# Patient Record
Sex: FEMALE | Race: WHITE
[De-identification: names, ages, dates, MRNs, and addresses within clinical notes are randomized per-mention and may not be internally consistent; named-entity substitution may affect disease eponyms.]

---

## 2017-01-16 ENCOUNTER — HOSPITAL ENCOUNTER (EMERGENCY)
Dept: HOSPITAL 80 - FED | Age: 52
Discharge: HOME | End: 2017-01-16
Payer: COMMERCIAL

## 2017-01-16 VITALS
RESPIRATION RATE: 18 BRPM | DIASTOLIC BLOOD PRESSURE: 82 MMHG | SYSTOLIC BLOOD PRESSURE: 105 MMHG | OXYGEN SATURATION: 98 %

## 2017-01-16 VITALS — TEMPERATURE: 98.6 F

## 2017-01-16 VITALS — HEART RATE: 86 BPM

## 2017-01-16 DIAGNOSIS — R19.7: Primary | ICD-10-CM

## 2017-01-16 DIAGNOSIS — R11.10: ICD-10-CM

## 2017-01-16 DIAGNOSIS — J45.909: ICD-10-CM

## 2017-01-16 LAB
% IMMATURE GRANULYOCYTES: 0.5 % (ref 0–1.1)
ABSOLUTE IMMATURE GRANULOCYTES: 0.04 10^3/UL (ref 0–0.1)
ABSOLUTE NRBC COUNT: 0.03 10^3/UL (ref 0–0.01)
ADD DIFF?: NO
ADD MORPH?: NO
ADD SCAN?: NO
ALBUMIN SERPL-MCNC: 4.2 G/DL (ref 3.5–5)
ALP SERPL-CCNC: 71 IU/L (ref 38–126)
ALT SERPL-CCNC: 37 IU/L (ref 9–52)
ANION GAP SERPL CALC-SCNC: 13 MEQ/L (ref 8–16)
AST SERPL-CCNC: 27 IU/L (ref 14–46)
ATYPICAL LYMPHOCYTE FLAG: 0 (ref 0–99)
BILIRUB SERPL-MCNC: 0.6 MG/DL (ref 0.1–1.4)
BILIRUBIN-CONJUGATED: 0.3 MG/DL (ref 0–0.5)
BILIRUBIN-UNCONJUGATED: 0.3 MG/DL (ref 0–1.1)
CALCIUM SERPL-MCNC: 9.9 MG/DL (ref 8.5–10.4)
CHLORIDE SERPL-SCNC: 105 MEQ/L (ref 97–110)
CO2 SERPL-SCNC: 20 MEQ/L (ref 22–31)
COLOR UR: YELLOW
CREAT SERPL-MCNC: 0.7 MG/DL (ref 0.6–1)
ERYTHROCYTE [DISTWIDTH] IN BLOOD BY AUTOMATED COUNT: 13.8 % (ref 11.5–15.2)
FRAGMENT RBC FLAG: 0 (ref 0–99)
GFR SERPL CREATININE-BSD FRML MDRD: > 60 ML/MIN/{1.73_M2}
GLUCOSE SERPL-MCNC: 106 MG/DL (ref 70–100)
HCT VFR BLD CALC: 40.8 % (ref 38–47)
HGB BLD-MCNC: 13.6 G/DL (ref 12.6–16.3)
LEFT SHIFT FLG: 0 (ref 0–99)
LIPEMIA HEMOLYSIS FLAG: 80 (ref 0–99)
MCH RBC BLDCO QN: 30.8 PG (ref 27.9–34.1)
MCHC RBC AUTO-ENTMCNC: 33.3 G/DL (ref 32.4–36.7)
MCV RBC AUTO: 92.3 FL (ref 81.5–99.8)
NITRITE UR QL STRIP: NEGATIVE
NRBC-AUTO%: 0.3 % (ref 0–0.2)
PH UR STRIP: 6 [PH] (ref 5–7.5)
PLATELET # BLD: 268 10^3/UL (ref 150–400)
PLATELET CLUMPS FLAG: 20 (ref 0–99)
PMV BLD AUTO: 9.6 FL (ref 8.7–11.7)
POTASSIUM SERPL-SCNC: 4.2 MEQ/L (ref 3.5–5.2)
PROT SERPL-MCNC: 6.7 G/DL (ref 6.3–8.2)
RBC # BLD AUTO: 4.42 10^6/UL (ref 4.18–5.33)
SODIUM SERPL-SCNC: 138 MEQ/L (ref 134–144)
SP GR UR STRIP: 1.02 (ref 1–1.03)

## 2017-01-17 ENCOUNTER — HOSPITAL ENCOUNTER (INPATIENT)
Dept: HOSPITAL 80 - FED | Age: 52
LOS: 3 days | Discharge: HOME | DRG: 391 | End: 2017-01-20
Attending: INTERNAL MEDICINE | Admitting: INTERNAL MEDICINE
Payer: COMMERCIAL

## 2017-01-17 DIAGNOSIS — Z86.718: ICD-10-CM

## 2017-01-17 DIAGNOSIS — G35: ICD-10-CM

## 2017-01-17 DIAGNOSIS — J45.909: ICD-10-CM

## 2017-01-17 DIAGNOSIS — G93.40: ICD-10-CM

## 2017-01-17 DIAGNOSIS — A08.4: Primary | ICD-10-CM

## 2017-01-17 DIAGNOSIS — M25.559: ICD-10-CM

## 2017-01-17 DIAGNOSIS — E03.9: ICD-10-CM

## 2017-01-17 LAB
% IMMATURE GRANULYOCYTES: (no result) % (ref 0–1.1)
% IMMATURE GRANULYOCYTES: 0.3 % (ref 0–1.1)
ABSOLUTE IMMATURE GRANULOCYTES: (no result) 10^3/UL (ref 0–0.1)
ABSOLUTE IMMATURE GRANULOCYTES: 0.02 10^3/UL (ref 0–0.1)
ABSOLUTE NRBC COUNT: (no result) 10^3/UL (ref 0–0.01)
ABSOLUTE NRBC COUNT: 0.02 10^3/UL (ref 0–0.01)
ADD DIFF?: (no result)
ADD DIFF?: NO
ADD MORPH?: (no result)
ADD MORPH?: NO
ADD SCAN?: (no result)
ADD SCAN?: NO
ALBUMIN SERPL-MCNC: 3.7 G/DL (ref 3.5–5)
ALP SERPL-CCNC: 59 IU/L (ref 38–126)
ALT SERPL-CCNC: 34 IU/L (ref 9–52)
ANION GAP SERPL CALC-SCNC: 14 MEQ/L (ref 8–16)
AST SERPL-CCNC: 33 IU/L (ref 14–46)
ATYPICAL LYMPHOCYTE FLAG: (no result) (ref 0–99)
ATYPICAL LYMPHOCYTE FLAG: 10 (ref 0–99)
BILIRUB SERPL-MCNC: 0.7 MG/DL (ref 0.1–1.4)
BILIRUBIN-CONJUGATED: 0.1 MG/DL (ref 0–0.5)
BILIRUBIN-UNCONJUGATED: 0.6 MG/DL (ref 0–1.1)
CALCIUM SERPL-MCNC: 9.1 MG/DL (ref 8.5–10.4)
CHLORIDE SERPL-SCNC: 105 MEQ/L (ref 97–110)
CO2 SERPL-SCNC: 19 MEQ/L (ref 22–31)
COLOR UR: YELLOW
CREAT SERPL-MCNC: 0.5 MG/DL (ref 0.6–1)
ERYTHROCYTE [DISTWIDTH] IN BLOOD BY AUTOMATED COUNT: (no result) % (ref 11.5–15.2)
ERYTHROCYTE [DISTWIDTH] IN BLOOD BY AUTOMATED COUNT: 13.6 % (ref 11.5–15.2)
FRAGMENT RBC FLAG: (no result) (ref 0–99)
FRAGMENT RBC FLAG: 0 (ref 0–99)
GFR SERPL CREATININE-BSD FRML MDRD: > 60 ML/MIN/{1.73_M2}
GLUCOSE SERPL-MCNC: 102 MG/DL (ref 70–100)
HCT VFR BLD CALC: (no result) % (ref 38–47)
HCT VFR BLD CALC: 34.7 % (ref 38–47)
HGB BLD-MCNC: (no result) G/DL (ref 12.6–16.3)
HGB BLD-MCNC: 11.8 G/DL (ref 12.6–16.3)
LEFT SHIFT FLG: (no result) (ref 0–99)
LEFT SHIFT FLG: 0 (ref 0–99)
LIPEMIA HEMOLYSIS FLAG: (no result) (ref 0–99)
LIPEMIA HEMOLYSIS FLAG: 90 (ref 0–99)
LITHIUM SERPL-SCNC: < 0.2 MEQ/L (ref 0.6–1.2)
MCH RBC BLDCO QN: (no result) PG (ref 27.9–34.1)
MCH RBC BLDCO QN: 31.3 PG (ref 27.9–34.1)
MCHC RBC AUTO-ENTMCNC: (no result) G/DL (ref 32.4–36.7)
MCHC RBC AUTO-ENTMCNC: 34 G/DL (ref 32.4–36.7)
MCV RBC AUTO: (no result) FL (ref 81.5–99.8)
MCV RBC AUTO: 92 FL (ref 81.5–99.8)
NITRITE UR QL STRIP: NEGATIVE
NRBC-AUTO%: (no result) % (ref 0–0.2)
NRBC-AUTO%: 0.3 % (ref 0–0.2)
PH UR STRIP: 6 [PH] (ref 5–7.5)
PLATELET # BLD: (no result) 10^3/UL (ref 150–400)
PLATELET # BLD: 196 10^3/UL (ref 150–400)
PLATELET CLUMPS FLAG: (no result) (ref 0–99)
PLATELET CLUMPS FLAG: 30 (ref 0–99)
PMV BLD AUTO: (no result) FL (ref 8.7–11.7)
PMV BLD AUTO: 10 FL (ref 8.7–11.7)
POTASSIUM SERPL-SCNC: 4 MEQ/L (ref 3.5–5.2)
PROT SERPL-MCNC: 6.5 G/DL (ref 6.3–8.2)
RBC # BLD AUTO: (no result) 10^6/UL (ref 4.18–5.33)
RBC # BLD AUTO: 3.77 10^6/UL (ref 4.18–5.33)
SODIUM SERPL-SCNC: 138 MEQ/L (ref 134–144)
SP GR UR STRIP: 1.01 (ref 1–1.03)

## 2017-01-17 PROCEDURE — A9585 GADOBUTROL INJECTION: HCPCS

## 2017-01-17 RX ADMIN — SODIUM CHLORIDE SCH MLS: 900 INJECTION, SOLUTION INTRAVENOUS at 22:04

## 2017-01-17 RX ADMIN — ALBUTEROL SULFATE SCH: 90 AEROSOL, METERED RESPIRATORY (INHALATION) at 22:06

## 2017-01-17 RX ADMIN — LEVALBUTEROL TARTRATE SCH: 45 AEROSOL, METERED ORAL at 22:07

## 2017-01-17 NOTE — MR
MRI of the Brain (Without and With Contrast) at 1514 hours



Clinical Indications:  Multiple sclerosis, G35, vertigo.



COMPARISON:  MRI brain August 2015.



Technique:  T1-weighted images were acquired axially and sagittally from the foramen magnum to the ve
rtex.  Axial fast inversion-recovery, fast T2-weighted, and diffusion-weighted axial images were obta
ined, without contrast.  Postcontrast axial and coronal images, with the uneventful intravenous admin
istration of 5 mL mL Gadavist contrast.



Findings:  The ventricles, cisterns, and sulci are widened consistent with atrophy.  No hydrocephalus
, midline shift, herniation, or epidural/subdural hematomas.  No intracranial hemorrhage or masses.  
Diffusion-weighted images demonstrate no acute infarct.  Cerebellar tonsils are in normal position.  
Pituitary gland is normal in size.  Normal signal flow void in the superior sagittal sinus, basilar a
rtery, and bilateral internal carotid arteries indicating patency.  Multiple nonenhancing demyelinati
ng plaques again noted throughout bilateral cerebral hemispheres, especially bilateral frontal, parie
bill and occipital lobes, in the periventricular white matter.  No enhancing plaques.  No significant 
change in nonenhancing demyelinating plaques when compared to the August 2015 study.  No evidence of 
brainstem or cerebellar infarcts or enhancing masses.  Postcontrast images demonstrate no enhancing l
esions or abnormal leptomeningeal enhancement.  Paranasal sinuses and mastoid air cells are clear. 



Impressions

1.  No significant change in multiple nonenhancing demyelinating plaques throughout bilateral cerebra
l hemispheres. 

2.  No acute hemorrhage, definite acute infarct, hydrocephalus or mass effect.

3.  No enhancing lesions.



Findings and recommendations discussed with Dr. Tavo Davis at 1630 hours, on January 17, 2017.



E:amm

## 2017-01-17 NOTE — GHP
[f 
rep st]



                                                            HISTORY AND PHYSICAL





DATE OF ADMISSION:  01/17/2017



CHIEF COMPLAINT:  Acute encephalopathy.



HISTORY OF PRESENT ILLNESS:  The patient is a 51-year-old female, with history 
of multiple sclerosis followed by Dr. Tavo Davis, asthma, and chronic pain, 
who was sent over from Dr. Davis' office with acute encephalopathy.  Most 
of her history is per ED report and patient's .  They had recently been 
visiting her family in [place] last week.  They returned on Wednesday, 
and patient began having nausea.  This then progressed to diarrhea and vomiting 
on Saturday, as well as confusion and lethargy.  The patient presented to the 
emergency room here yesterday with normal vitals, and patient wished to go 
home.  They returned today for persistent confusion while in clinic.  She 
denies any ill contacts.  No cough.  Intermittent headaches.  Has not been 
eating or drinking much fluids over the last several days.  No myalgias.  Has 
had some hallucinations, per .  She recently underwent a thumb surgery 
December 28th.  At that time, she started on Nucynta.  She also was started on 
a Butrans patch recently, I am unclear of the date, per her pain doctor for 
chronic hip pain.  Denies fevers at home.  No chills or sweats.  Of note, 
patient has been taking Tysabri injections.  Missed her last dose.  Next 
scheduled January 18th.



REVIEW OF SYSTEMS:  I completed a 10-point review of systems, negative except 
as noted in HPI.



PAST MEDICAL HISTORY:  

1.  Asthma.

2.  MS.

3.  Interstitial cystitis.

4.  Chronic hip pain.

5.  Migraines.

6.  Hypothyroidism.

7.  Axillary DVT secondary port.

8.  Left ventricular papillary muscle tumor.



PAST SURGICAL HISTORY:  

1.  Left thumb surgery.

2.  Bilateral knees.

3.  Hernia repair.

4.  Tonsillectomy.

5.  Breast biopsy.



SOCIAL HISTORY:  Lives in [place] with her , phone number is [phone]
[phone].  Drinks alcohol socially.  No drugs or tobacco.



FAMILY HISTORY:  Mother with a blood disorder.



PHYSICAL EXAM:  VITAL SIGNS:  Temperature 36.6, blood pressure 105/72, heart 
rate 77, respirations 16, 96% on room air.  GENERAL:  Patient is tired appearing
, no acute distress.  HEENT:  PERRLA.  EOMI.  Oropharynx is clear, without 
exudate or erythema.  CV:  Regular rate and rhythm.  No murmurs, gallops, or 
rubs.  LUNGS:  Clear to auscultation.  ABDOMEN:  Mildly tender diffusely.  No 
rebound or guarding.  Positive bowel sounds throughout.  :  No suprapubic 
tenderness.  MUSCULOSKELETAL:  Patient not participating fully in exam.  SKIN:  
No rash or ulceration.  NEUROLOGIC:  2 through 12 intact.  However, patient 
again is not participating in my exam.  PSYCHIATRIC:  Alert to place only.



LABS:  WBC 6.7, hemoglobin 11, hematocrit 34, platelets 186.  Sodium 138, 
potassium 4, chloride 105, carbon dioxide 19, BUN 10, creatinine 0.5, glucose 
102.  UA is negative. 



Abdominal CT pending. 



MRI brain pending.



MEDICATIONS:  

1.  Tramadol 50 mg p.r.n.

2.  Nucynta 50 mg p.o. q.4-6 hours p.r.n.

3.  Sumatriptan p.r.n.

4.  Zofran p.r.n.

5.  Tysabri monthly.

6.  Dulera inhaler.

7.  Namenda 10 mg twice daily.

8.  Melatonin.

9.  Lithium 300 mg at bedtime.

10.  Levothyroxine 75 mcg.

11.  Xopenex inhaler three times daily.

12.  Gabapentin 300 mg at bedtime.

13.  Dextromethorphan q.12 hours.

14.  Butrans 10 mcg patch.

15.  Albuterol inhaled as needed.



ALLERGIES:  Amoxicillin, potassium carbonate, and Glatiramer acetate.



ASSESSMENT AND PLAN:  

1.  Acute encephalopathy:  Differential is broad at this point.  Considering 
infection versus metabolic versus medication versus neurologic.  There was 
concern for leukoencephalopathy, given patient is on immunosuppressants.  UA is 
negative, as well as abdominal CT.  MRI is pending of brain.  No metabolic 
abnormalities.  The patient has recently started on Nucynta, as well as Butrans 
patch, which could contribute to encephalopathy.  Per , she has been 
having hallucinations.  Also checking a lithium level.  Checking TSH. Dr. Curtis will evaluate inpatient.

2.  Multiple sclerosis:  Currently on Tysabri injections.  MRI is pending.  
Neurology has been consulted.

3.  Asthma:  Continue home inhalers.

4.  Chronic hip pain:  We will hold patch for now.  We will dose p.r.n. 
tramadol if needed.

5.  Migraines.  Imitrex p.r.n.

6.  Hypothyroidism.  Continue levothyroxine.  

7.  History of ventricular papillary muscle tumor:  Followed by Dr. Watt

8.  History of axillary deep vein thrombosis:  This is associated to a port.  
This had been removed.

9.  Diarrhea, vomiting: suspect viral gasteroenteritis. Denies any ill 
contacts.  Electrolytes are within normal.  We will check a C. difficile.

10.  Diet:  Regular.

11.  Deep vein thrombosis prophylaxis.  Lovenox.  



DISPOSITION:  Patient warrants inpatient admission, given acute encephalopathy, 
pending further studies, MRI, and Neurology consult.





Job #:  [id number]

MTDD

## 2017-01-17 NOTE — GCON
[f rep st]



                                                                    CONSULTATION





NEUROLOGIC CONSULTATION.



HISTORY:  The patient is a 51-year-old woman who I follow in my clinic regularly for multiple scleros
is.  She is currently on Tysabri and is JOHN virus antibody positive.  She has been doing relatively we
ll overall with her multiple sclerosis, but in the last several days, has had a precipitating decline
 in function in the setting of a gastroenteritis.  She has not been able to take some of her medicine
s and has not had her Tysabri infusion this month.  Her symptoms were bad enough to come to the emerg
ency room last night, but she did not want to be admitted.  I spoke to her  today and was conc
erned about her altered mental status, so she was advised to come to the emergency room for admission
 and further workup to be sure we are not missing any acute lesions from multiple sclerosis with the 
possibility of PML.  She is on several pain medications which might be contributing, but we are not s
ure the exact timing of those changes.  She currently has a hard time answering the historical questi
ons, but is denying having prominent nausea or headache or focal numbness or weakness.  She has a bit
 of a tendency to be unresponsive, but she will smile occasionally and can answer questions sometimes
 appropriately and sometimes following commands, but she may also perseverate at times or have a hard
 time fully comprehending what I am asking her.  She cannot give history to describe any alleviating 
or exacerbating factors.  Symptoms by her description sound as if they are fairly mild, but the excep
tion is this language problem.  She seemed to acknowledge that she knows what she wants to say, but h
as a hard time expressing herself.  Her  is able to clarify that he feels she was certainly wo
rse several hours ago compared to 24 hours ago when she was already having a hard time communicating.
  However, a few hours after I initially saw her today and came back to the emergency department afte
r reviewing the MRI and told her it was normal, that is when she 1st started to smile and was opening
 her eyes spontaneously, where as earlier, she was keeping her eyes closed and seemed much more uncom
fortable.



REVIEW OF SYSTEMS:  Otherwise, a 10-point review of systems was completed and unremarkable except for
 that noted above.



PAST MEDICAL HISTORY:  Notable for the multiple sclerosis.  She has a left ventricular papillary tumo
r, history of axillary DVT, hypothyroidism, migraine, hip pain, interstitial cystitis, asthma.  She l
ml with her .  She has occasional alcohol.  She is not a smoker.  Her mother has some type o
f hematologic disorder. 



Prior to coming to the hospital, she had been on tramadol, Nucynta, sumatriptan as needed, Zofran, Ty
sabri monthly, Dulera inhaler, Namenda 10 mg twice daily, melatonin, lithium 300 at bedtime, levothyr
oxine, Xopenex inhaler, gabapentin, dextromethorphan, Butrans 10 mcg patch and albuterol as needed.



ALLERGIES:  To amoxicillin, potassium and Glatiramer acetate which is Copaxone.



PHYSICAL EXAM:  VITAL SIGNS:  Blood pressure 105/72, pulse of 77, temperature 36.6, respirations 16. 
 GENERAL:  She is well developed, lying in the bed, in no acute distress.  EYES:  Clear.  NECK:  Supp
le with no bruits or masses.  CARDIAC:  Regular rate and rhythm.  No murmur.  NEUROLOGIC:  Somewhat l
imited due to her limited participation, but she is currently not having spontaneous speech but gradu
ally interacting more with me in a more appropriate fashion, but is still far from her baseline which
 is completely normal cognitively and able to interact appropriately.  She will smile and say pleasan
tries like thank you, and seems to be feeling a little bit better and acknowledging with yes/no words
 or shaking her head appropriately as to whether she has numbness or pain or nausea or headache.  She
 will not really follow my instructions consistently.  I cannot get her to repeat.  I am asking her o
rientation questions and she simply is not answering or sometimes says, "what do you mean?" when I as
k her the date.  The pupils are 3 mm and reactive.  Extraocular movements are intact.  I do not detec
t any focal numbness or weakness. 



I have reviewed the brain MRI directly in discussion with Dr. Mobley, and we see no significant graham
es in the white matter lesions from August of last year, and the changes are consistent with her know
n diagnosis of multiple sclerosis, but nothing that looks suspicious for PML.  There are no enhancing
 lesions.



LABORATORY:  Diagnostic studies show hematocrit of 34%.  Chemistries:  Mild acidosis, glucose 102.  L
iver enzymes normal.  Urinalysis unremarkable.



IMPRESSION:  Leilani has been acutely ill over the last few days and has had a significant decline in m
ental state.  She is on Tysabri, and has a risk for PML.  Historically, this condition would present 
with distinct MRI lesions which we simply do not see and the fact that she has been stable in the las
t several hours and not getting significantly worse makes me less concerned that that is the diagnosi
s.  This may be multifactorial with the side effects of multiple psychoactive drugs and her gastroent
eritis and a pseudo exacerbation of multiple sclerosis.  I do not think she is having seizures.  We d
o not see evidence of stroke.  She certainly needs to be hospitalized for close monitoring and try to
 limit the pain medications as much as possible to get her mental status cleared if it can be attribu
fatimah to some of the pain medicines, and then work on her hydration status as well. 



I incorrectly stated above that she is JOHN virus antibody positive, but she is actually JOHN virus antib
chance negative with the most recent testing in January.  I had also previously checked her for aquapori
n-4 in case she might have neuromyelitis optica, but that was also negative.  Therefore, this is furt
her argument against the risk of PML.  We will simply continue to monitor her clinical course for now
.





Job #:  [id number]

## 2017-01-17 NOTE — EDPHY
H & P


Stated Complaint: n/v/d  seen yesterday for same


Time Seen by Provider: 01/17/17 12:52


HPI/ROS: 


CHIEF COMPLAINT:  Vomiting, altered mental status





HISTORY OF PRESENT ILLNESS:  The patient is a 51-year-old female with a history 

of multiple sclerosis who was sent here from Dr. Tavo Davis office for an 

MRI and admission.  She has been suffering for the last 4 days from nausea, 

vomiting, headache and mild diarrhea.  Her  states that she has also 

been progressively decreased mental status.  She no longer answers most 

questions.  She will occasionally speaking whole since his but mostly shakes 

her head yes or no.  She is alert and oriented. She was seen here yesterday and 

had unremarkable lab work was hydrated and discharged.  She followed up with 

Dr. Davis today who is concerned about her worsening mental status and 

recommended she come here for an MRI to rule out leukoencephalopathy admission.

  The patient does take Tysabri injections monthly but did not have her last 

dose.  Her  also states that she has not taken any of her normal pain 

medications for last 2 days because of the vomiting. She has not had a fever.  

She does complain of some dysuria.  No blood in her vomit or stool.





REVIEW OF SYSTEMS:


Constitutional:  denies: chills, fever, recent illness, recent injury


EENTM: denies: blurred vision, double vision, nose congestion


Respiratory: denies: cough, shortness of breath


Cardiac: denies: chest pain, irregular heart rate, lightheadedness, palpitations


Gastrointestinal/Abdominal:  See HPI


Genitourinary:  See HPI


Musculoskeletal: denies: joint pain, muscle pain


Skin: denies: lesions, rash, jaundice, bruising


Neurological: denies: headache, numbness, paresthesia, tingling, dizziness, 

weakness


Hematologic/Lymphatic: denies: blood clots, easy bleeding, easy bruising


Immunologic/allergic: denies: HIV/AIDS, transplant








EXAM:


GENERAL:  Well-appearing, well-nourished and in no acute distress.


HEAD:  Atraumatic, normocephalic.


EYES:  Pupils equal round and reactive to light, extraocular movements intact, 

sclera anicteric, conjunctiva are normal.


ENT:  TMs normal, nares patent, oropharynx clear without exudates.  Moist 

mucous membranes.


NECK:  Normal range of motion, supple without lymphadenopathy or JVD.


LUNGS:  Breath sounds clear to auscultation bilaterally and equal.  No wheezes 

rales or rhonchi.


HEART:  Regular rate and rhythm without murmurs, rubs or gallops.


ABDOMEN:  Mild diffuse pain, no tenderness, Soft, normoactive bowel sounds.  No 

guarding, no rebound.  No masses appreciated.


BACK:  No CVA tenderness, no spinal tenderness, step-offs or deformities


EXTREMITIES:  Normal range of motion, no pitting or edema.  No clubbing or 

cyanosis.


NEUROLOGICAL:  Cranial nerves II through XII grossly intact.  Normal speech, 

normal gait.  5/5 strength, normal movement in all extremities, normal sensation


PSYCH:  Minimally interactive.  Will occasionally answer questions when her 

 is getting the answer wrong.  She is able to verbalize.


SKIN:  Warm, dry, normal turgor, no visible rashes or lesions.








Source: Patient


Exam Limitations: No limitations





- Personal History


LMP (Females 10-55): 15-21 Days Ago


Current Tetanus/Diphtheria Vaccine: Yes


Tetanus Vaccine Date: 04/2006





- Medical/Surgical History


Hx Asthma: Yes


Hx Chronic Respiratory Disease: No


Hx Diabetes: No


Hx Cardiac Disease: No


Hx Renal Disease: No


Hx Cirrhosis: No


Hx Alcoholism: No


Hx HIV/AIDS: No


Hx Splenectomy or Spleen Trauma: No


Other PMH: axillary DVT near port,MS, pelvic injury, hypothyroid, bilat knee 

surgeries, hernia, breast biopsies, tonsillectomy, LEFT VENTRICLE PAPILLARY 

MUSCLE TUMOR





- Family History


Significant Family History: Hypertension





- Social History


Smoking Status: Never smoked


Alcohol Use: Sober


Drug Use: None


Constitutional: 


 Initial Vital Signs











Temperature (C)  36.6 C   01/17/17 12:26


 


Heart Rate  77   01/17/17 12:26


 


Respiratory Rate  16   01/17/17 12:26


 


Blood Pressure  105/72   01/17/17 12:26


 


O2 Sat (%)  96   01/17/17 12:26








 











O2 Delivery Mode               Room Air














Allergies/Adverse Reactions: 


 





amoxicillin trihydrate [From Augmentin] Allergy (Severe, Verified 01/17/17 12:25

)


 Diarrhea


potassium clavulanate [From Augmentin] Allergy (Severe, Verified 01/17/17 12:25)


 Diarrhea


glatiramer acetate [From Copaxone] Allergy (Verified 01/17/17 12:25)


 Hives








Home Medications: 














 Medication  Instructions  Recorded


 


Buprenorphine [Butrans 10 mcg/hr] 10 each TD Q7D 01/16/17


 


Dextromethorphan HBr/Quinidine 1 each PO Q12 01/16/17





[Nuedexta 20-10 mg Capsule]  


 


Gabapentin [Neurontin 300 MG (*)] 300 mg PO HS 01/16/17


 


Levalbuterol Inhaler [Xopenex Hfa 1 puffs IH TID 01/16/17





Inhaler (*)]  


 


Levothyroxine [Synthroid 75 mcg 75 mcg PO DAILY06 01/16/17





(*)]  


 


Lithium Carbonate [Lithium 300 mg PO HS 01/16/17





Carbonate Cap 300 mg (*)]  


 


Melatonin 1 mg SL HS 01/16/17


 


Mometasone/Formoterol [Dulera 100 1 puffs IH DAILY 01/16/17





Mcg/5 Mcg Inhaler]  


 


Natalizumab [Tysabri] 300 mg IV Q28D 01/16/17


 


Ondansetron Odt [Zofran Odt 4 mg 4 mg PO Q4 01/16/17





(*)]  


 


SUMAtriptan [Imitrex 50 MG (*)] 50 mg PO Q2H PRN 01/16/17


 


Tapentadol HCl [Nucynta 50 MG (*)] 50 mg PO Q4-6PRN PRN 01/16/17


 


traMADol [Ultram 50 mg (*)] 50 mg PO Q4-6PRN PRN 01/16/17


 


Albuterol Hfa Anes Only [Proair 1 - 2 puffs IH QID 01/17/17





Hfa Icu (*)]  


 


Memantine HCl [Namenda 10 mg] 10 mg PO BID 01/17/17














Medical Decision Making





- Diagnostics


Imaging: 


Results:


CT scan of the abdomen and pelvis was obtained.  The results of the study are 

negative.  The study was read by Dr. Nimesh Taylor.  I viewed the images myself 

on the PACS system.


ED Course/Re-evaluation: 


The patient does appear to intermittently be able to answer questions 

appropriately.  With Dr. Davis was in the she would not cooperate well with 

exam however for nursing staff she was cooperating quite well and was angry 

with the IV start.





2:40 p.m. I discussed the case with Dr. Chen who will admit to the medical 

service.


Differential Diagnosis: 


Partial list of the Differential diagnosis considered include but were not 

limited to;  gastritis, appendicitis, biliary disease, MS flare and although 

unlikely based on the history and physical exam, I also considered 

leukoencephalopathy, meningitis, tumor, hemorrhage, CVA.  I discussed these 

differential diagnoses and the plan with the patient as well as the usual and 

expected course.  The patient understands that the diagnosis is provisional and 

that in medicine we are not always correct and that further workup is often 

warranted.  Usual and customary warnings were given.  All of the patient's 

questions were answered.  The patient was instructed to return to the emergency 

department should the symptoms at all worsen or return, otherwise to followup 

with the physician as we discussed.





- Data Points


Laboratory Results: 


 Laboratory Results





 01/17/17 13:41 





 01/17/17 13:41 








Medications Given: 


 








Discontinued Medications





Albuterol Sulfate (Proair Hfa Anes Only)  1 - 2 puffs IH QID JACK


   Stop: 07/16/17 15:59


   Last Admin: 01/17/17 20:34 Dose:  Not Given


Hydromorphone HCl (Dilaudid)  0.5 mg IVP EDNOW ONE


   Stop: 01/17/17 13:05


   Last Admin: 01/17/17 13:40 Dose:  0.5 mg


Sodium Chloride (Ns)  1,000 mls @ 0 mls/hr IV ONCE ONE


   PRN Reason: Wide Open


   Stop: 01/17/17 13:05


   Last Admin: 01/17/17 13:40 Dose:  1,000 mls


Ondansetron HCl (Zofran)  4 mg IVP EDNOW ONE


   Stop: 01/17/17 13:05


   Last Admin: 01/17/17 13:40 Dose:  4 mg








Departure





- Departure


Disposition: [facility] Inpatient Acute


Clinical Impression: 


 Vomiting and diarrhea, Multiple sclerosis





Abdominal pain


Qualifiers:


 Abdominal location: generalized Qualifier Code: (R10.84) Generalized abdominal 

pain


Condition: Fair

## 2017-01-17 NOTE — CT
CT abdomen and pelvis with contrast.



Clinical indication: Nausea, vomiting and diarrhea with epigastric pain.



TECHNIQUE: 1.5 mm contiguous helical axial scanning through the abdomen and pelvis after the uneventf
ul administration of 85 mL of Isovue 300. Routine reconstructions were performed in the coronal and s
agittal planes. Dose reduction technique was performed.



COMPARISON: November 9, 2006, April 11, 2016.



FINDINGS: Lung bases are clear. The liver, gallbladder, spleen, pancreas, adrenals, and kidneys are g
rossly unremarkable. Small bowel and colon are unremarkable. There is a small amount of fluid in the 
endocervical canal. The patient is currently menstruating per report from ordering physician. The art
erial vasculature is unremarkable.



The appendix is visualized and normal. There is fecalith versus old contrast material in the lumen.



Bones exhibit mild degenerative changes.



IMPRESSION:

1. Small amount of fluid in the endocervical canal likely related to the patient's menstrual cycle.

2. No other radiographic findings to explain the patient's abdominal pain, nausea and epigastric pain
.



Critical results relayed by Dr. Nimesh Taylor to Dr. Nimesh Santos at 1447 hours on January 17, 2017.

## 2017-01-18 LAB
ANION GAP SERPL CALC-SCNC: 15 MEQ/L (ref 8–16)
CALCIUM SERPL-MCNC: 8.8 MG/DL (ref 8.5–10.4)
CHLORIDE SERPL-SCNC: 110 MEQ/L (ref 97–110)
CO2 SERPL-SCNC: 17 MEQ/L (ref 22–31)
CREAT SERPL-MCNC: 0.6 MG/DL (ref 0.6–1)
GFR SERPL CREATININE-BSD FRML MDRD: > 60 ML/MIN/{1.73_M2}
GLUCOSE SERPL-MCNC: 81 MG/DL (ref 70–100)
POTASSIUM SERPL-SCNC: 3.7 MEQ/L (ref 3.5–5.2)
SODIUM SERPL-SCNC: 142 MEQ/L (ref 134–144)

## 2017-01-18 RX ADMIN — Medication SCH: at 00:57

## 2017-01-18 RX ADMIN — LEVALBUTEROL TARTRATE SCH: 45 AEROSOL, METERED ORAL at 21:04

## 2017-01-18 RX ADMIN — MEMANTINE HYDROCHLORIDE SCH: 5 TABLET ORAL at 00:57

## 2017-01-18 RX ADMIN — ONDANSETRON SCH MG: 4 TABLET, ORALLY DISINTEGRATING ORAL at 14:49

## 2017-01-18 RX ADMIN — LEVALBUTEROL TARTRATE SCH: 45 AEROSOL, METERED ORAL at 09:52

## 2017-01-18 RX ADMIN — ALBUTEROL SULFATE SCH: 90 AEROSOL, METERED RESPIRATORY (INHALATION) at 16:08

## 2017-01-18 RX ADMIN — ACETAMINOPHEN PRN MG: 325 TABLET ORAL at 03:28

## 2017-01-18 RX ADMIN — ALBUTEROL SULFATE SCH: 90 AEROSOL, METERED RESPIRATORY (INHALATION) at 09:51

## 2017-01-18 RX ADMIN — LITHIUM CARBONATE SCH MG: 300 CAPSULE ORAL at 23:12

## 2017-01-18 RX ADMIN — ALBUTEROL SULFATE SCH: 90 AEROSOL, METERED RESPIRATORY (INHALATION) at 05:57

## 2017-01-18 RX ADMIN — ONDANSETRON PRN MG: 2 SOLUTION INTRAMUSCULAR; INTRAVENOUS at 08:47

## 2017-01-18 RX ADMIN — SODIUM CHLORIDE SCH MLS: 900 INJECTION, SOLUTION INTRAVENOUS at 16:26

## 2017-01-18 RX ADMIN — Medication SCH: at 21:04

## 2017-01-18 RX ADMIN — ONDANSETRON SCH MG: 4 TABLET, ORALLY DISINTEGRATING ORAL at 18:21

## 2017-01-18 RX ADMIN — ALBUTEROL SULFATE SCH: 90 AEROSOL, METERED RESPIRATORY (INHALATION) at 21:04

## 2017-01-18 RX ADMIN — ONDANSETRON SCH MG: 4 TABLET, ORALLY DISINTEGRATING ORAL at 22:34

## 2017-01-18 RX ADMIN — ONDANSETRON PRN MG: 2 SOLUTION INTRAMUSCULAR; INTRAVENOUS at 03:22

## 2017-01-18 RX ADMIN — LEVALBUTEROL TARTRATE SCH: 45 AEROSOL, METERED ORAL at 16:08

## 2017-01-18 RX ADMIN — MEMANTINE HYDROCHLORIDE SCH: 5 TABLET ORAL at 08:59

## 2017-01-18 RX ADMIN — LEVOTHYROXINE SODIUM SCH: 75 TABLET ORAL at 07:50

## 2017-01-18 RX ADMIN — MEMANTINE HYDROCHLORIDE SCH MG: 5 TABLET ORAL at 23:12

## 2017-01-18 NOTE — HOSPPROG
Hospitalist Progress Note


Assessment/Plan: 


51y female with confusion, N/V. This is my first encounter. Chart reviewed. D/W 

Dr Chen.





#N/V


nausea conts


no further vomiting





#Hx MS


appreciate neurology consult


no new signs of MRI





# Acute encephalopathy


resolved


pt choosing not to interact





#Hx migraines


none currently





#chronic hip pain


hold meds





#Dispo


unclear, cont supportive care


PT/OT





Subjective: No verbal interactions.  at bedside. Follows commands.


Objective: 


 Vital Signs











Temp Pulse Resp BP Pulse Ox


 


 37.2 C   70   16   103/69   96 


 


 01/18/17 11:05  01/18/17 11:05  01/18/17 11:05  01/18/17 11:05  01/18/17 11:05








 Laboratory Results





 01/17/17 14:38 





 01/18/17 06:05 





 











 01/17/17 01/18/17 01/19/17





 05:59 05:59 05:59


 


Intake Total  1139 


 


Output Total  400 


 


Balance  739 














- Physical Exam


Constitutional: no apparent distress, appears nourished, not in pain


Eyes: PERRL, anicteric sclera, EOMI


Ears, Nose, Mouth, Throat: moist mucous membranes, hearing normal, ears appear 

normal


Cardiovascular: regular rate and rhythym, No JVD, No edema


Respiratory: no respiratory distress, no rales or rhonchi, reduced air movement


Gastrointestinal: No tenderness, No ascites, No guarding


Skin: warm, normal color, No erythema


Musculoskeletal: normal joint ROM, no joint effusions, generalized weakness


Psychiatric: not anxious, anxious, flat affect, No interacting appropriately





ICD10 Worksheet


Patient Problems: 


 Problems











Problem Status Diagnosed


 


Abdominal pain Acute 


 


Chest pressure Acute 


 


Multiple sclerosis Acute 


 


Shortness of breath Acute 


 


Vomiting and diarrhea Acute 


 


Chest pain Acute

## 2017-01-18 NOTE — NEUROPROG
Assessment: 


Pt with MS but no new lesions on MRI and not changes suspicious for PML. She is 

JOHN virus antibody negative. She is stable but still with confusing picture of 

gastrointestinal symptoms (history of chronic nausea of unknown cause), chronic 

pain, and more recent acute change in alertness, speech and communication. I 

have reviewed all with  as well and patient and her nurse. I have tried 

to call her sister, who is an MD and is requesting an update. I left message to 

call me. For now, gradually adding back her meds as tolerated is appropriate, 

but it is really unclear why this is occurring with a pseudoexacerbation being 

a possible cause with the combination of acute GI problems and med effects. 


Subjective: 


Pt remains limited in communication and expresses mild to moderate pain. She 

remains nauseated. She has been on some new treatment with Butrans and takes 

variable amounts of Nucynta and tramadol but exact dosing is unclear. She is 

not able to give me much history this am.


Objective: 





 Vital Signs











Temp Pulse Resp BP Pulse Ox


 


 37.1 C   65   18   118/79   96 


 


 01/18/17 08:00  01/18/17 08:00  01/18/17 08:00  01/18/17 08:00  01/18/17 08:00








 Laboratory Results





 01/17/17 14:38 





 01/18/17 06:05 





 











 01/17/17 01/18/17 01/19/17





 05:59 05:59 05:59


 


Intake Total  1139 


 


Output Total  400 


 


Balance  739 








She is awake but lethargic. She will open her eyes and is following commands in 

all of the extremities. She is not focally weak and can ambulate slowly per 

nurse. She is speaking only single words with rare partial sentences and having 

some trouble with writing. 


Allergies/Adverse Reactions: 


 





amoxicillin trihydrate [From Augmentin] Allergy (Severe, Verified 01/17/17 12:25

)


 Diarrhea


potassium clavulanate [From Augmentin] Allergy (Severe, Verified 01/17/17 12:25)


 Diarrhea


glatiramer acetate [From Copaxone] Allergy (Verified 01/17/17 12:25)


 Hives

## 2017-01-19 RX ADMIN — MEMANTINE HYDROCHLORIDE SCH MG: 5 TABLET ORAL at 21:47

## 2017-01-19 RX ADMIN — ONDANSETRON SCH MG: 4 TABLET, ORALLY DISINTEGRATING ORAL at 01:47

## 2017-01-19 RX ADMIN — ONDANSETRON SCH: 4 TABLET, ORALLY DISINTEGRATING ORAL at 17:35

## 2017-01-19 RX ADMIN — LEVALBUTEROL TARTRATE SCH: 45 AEROSOL, METERED ORAL at 22:19

## 2017-01-19 RX ADMIN — Medication SCH MG: at 21:48

## 2017-01-19 RX ADMIN — ONDANSETRON SCH MG: 4 TABLET, ORALLY DISINTEGRATING ORAL at 16:17

## 2017-01-19 RX ADMIN — LITHIUM CARBONATE SCH MG: 300 CAPSULE ORAL at 21:48

## 2017-01-19 RX ADMIN — LEVALBUTEROL TARTRATE SCH: 45 AEROSOL, METERED ORAL at 17:16

## 2017-01-19 RX ADMIN — ONDANSETRON SCH MG: 4 TABLET, ORALLY DISINTEGRATING ORAL at 07:36

## 2017-01-19 RX ADMIN — MEMANTINE HYDROCHLORIDE SCH MG: 5 TABLET ORAL at 10:50

## 2017-01-19 RX ADMIN — ACETAMINOPHEN PRN MG: 325 TABLET ORAL at 01:32

## 2017-01-19 RX ADMIN — ALBUTEROL SULFATE SCH: 90 AEROSOL, METERED RESPIRATORY (INHALATION) at 06:21

## 2017-01-19 RX ADMIN — ENOXAPARIN SODIUM SCH: 100 INJECTION SUBCUTANEOUS at 10:57

## 2017-01-19 RX ADMIN — ONDANSETRON SCH MG: 4 TABLET, ORALLY DISINTEGRATING ORAL at 10:48

## 2017-01-19 RX ADMIN — LEVOTHYROXINE SODIUM SCH MCG: 75 TABLET ORAL at 07:36

## 2017-01-19 RX ADMIN — LEVALBUTEROL TARTRATE SCH: 45 AEROSOL, METERED ORAL at 09:48

## 2017-01-19 NOTE — HOSPPROG
Hospitalist Progress Note


Assessment/Plan: 


51y female with confusion, N/V. D/W Dr Davis.





#N/V


nausea conts


Some vomiting





#Hx MS


appreciate neurology consult


no new signs of MRI





# Acute encephalopathy


resolved


pt interacting





#Hx migraines


none currently





#chronic hip pain


hold meds





#Dispo


unclear, cont supportive care


PT/OT


Possible DC in the next 1-2 days if vomiting subsides





Subjective: Still having some nausea.  Had 1 bout of emesis this morning.  No 

complaints of pain or other issues.


Objective: 


 Vital Signs











Temp Pulse Resp BP Pulse Ox


 


 36.6 C   63   16   103/71   99 


 


 01/19/17 11:46  01/19/17 11:46  01/19/17 11:46  01/19/17 11:46  01/19/17 11:46








 Laboratory Results





 01/17/17 14:38 





 01/18/17 06:05 





 











 01/18/17 01/19/17 01/20/17





 05:59 05:59 05:59


 


Intake Total 1139 1463 


 


Output Total 400  


 


Balance 739 1463 














- Physical Exam


Constitutional: no apparent distress, not in pain


Eyes: PERRL, anicteric sclera


Ears, Nose, Mouth, Throat: moist mucous membranes, hearing normal


Cardiovascular: No JVD, No edema


Respiratory: no respiratory distress, reduced air movement


Gastrointestinal: No tenderness, No ascites, No guarding


Skin: warm, normal color, No erythema


Musculoskeletal: normal joint ROM, no joint effusions, generalized weakness


Neurologic: AAOx3


Psychiatric: not anxious, not encephalopathic, flat affect





ICD10 Worksheet


Patient Problems: 


 Problems











Problem Status Diagnosed


 


Abdominal pain Acute 


 


Chest pressure Acute 


 


Multiple sclerosis Acute 


 


Shortness of breath Acute 


 


Vomiting and diarrhea Acute 


 


Chest pain Acute

## 2017-01-19 NOTE — NEUROPROG
Assessment: 


Pt with MS but no new lesions on MRI and not changes suspicious for PML. She is 

JOHN virus antibody negative. She is stable but still with confusing picture of 

gastrointestinal symptoms (history of chronic nausea of unknown cause), chronic 

pain, and more recent acute change in alertness, speech and communication. I 

have reviewed all with  as well and patient and her nurse. I have tried 

to call her sister, who is an MD and is requesting an update. I left message to 

call me. For now, gradually adding back her meds as tolerated is appropriate, 

but it is really unclear why this is occurring with a pseudoexacerbation being 

a possible cause with the combination of acute GI problems and med effects. 





1/19/17:


Neurologic symptoms have resolved almost completely. The cause remains 

uncertain. Perhaps the GI problems plus meds have been the issue but unclear. 


I spent 30 minutes in face to face discussion with more than 50% counseling and 

review of the plan. Home once able to maintain hydration and food.


Subjective: 


Pt is much better now and able to communicate normally. She explained symptoms 

and how she is feeling, but she is not clear on the cause. She definitely had 

some problems with hallucinations on Nucynta and still has some illusions. 

Still anorexic with poor oral intake. 


Objective: 





 Vital Signs











Temp Pulse Resp BP Pulse Ox


 


 37.1 C   83   16   106/64   97 


 


 01/19/17 07:59  01/19/17 07:59  01/19/17 07:59  01/19/17 07:59  01/19/17 07:59








 Laboratory Results





 01/17/17 14:38 





 01/18/17 06:05 





 











 01/18/17 01/19/17 01/20/17





 05:59 05:59 05:59


 


Intake Total 1139 1463 


 


Output Total 400  


 


Balance 739 1463 








Alert attentive with clear and fluent speech and back to her normal mental 

state.


Allergies/Adverse Reactions: 


 





amoxicillin trihydrate [From Augmentin] Allergy (Severe, Verified 01/17/17 12:25

)


 Diarrhea


potassium clavulanate [From Augmentin] Allergy (Severe, Verified 01/17/17 12:25)


 Diarrhea


glatiramer acetate [From Copaxone] Allergy (Verified 01/17/17 12:25)


 Hives

## 2017-01-20 VITALS
TEMPERATURE: 98.2 F | RESPIRATION RATE: 16 BRPM | OXYGEN SATURATION: 95 % | SYSTOLIC BLOOD PRESSURE: 107 MMHG | DIASTOLIC BLOOD PRESSURE: 75 MMHG | HEART RATE: 64 BPM

## 2017-01-20 RX ADMIN — ONDANSETRON SCH: 4 TABLET, ORALLY DISINTEGRATING ORAL at 09:02

## 2017-01-20 RX ADMIN — ENOXAPARIN SODIUM SCH: 100 INJECTION SUBCUTANEOUS at 08:46

## 2017-01-20 RX ADMIN — LEVALBUTEROL TARTRATE SCH: 45 AEROSOL, METERED ORAL at 08:58

## 2017-01-20 RX ADMIN — LEVOTHYROXINE SODIUM SCH MCG: 75 TABLET ORAL at 08:43

## 2017-01-20 RX ADMIN — MEMANTINE HYDROCHLORIDE SCH MG: 5 TABLET ORAL at 08:43

## 2017-01-20 RX ADMIN — ONDANSETRON SCH: 4 TABLET, ORALLY DISINTEGRATING ORAL at 03:22

## 2017-01-20 RX ADMIN — ONDANSETRON SCH: 4 TABLET, ORALLY DISINTEGRATING ORAL at 00:50

## 2017-01-20 NOTE — NEUROPROG
Assessment: 


Pt with MS but no new lesions on MRI and not changes suspicious for PML. She is 

JOHN virus antibody negative. She is stable but still with confusing picture of 

gastrointestinal symptoms (history of chronic nausea of unknown cause), chronic 

pain, and more recent acute change in alertness, speech and communication. I 

have reviewed all with  as well and patient and her nurse. I have tried 

to call her sister, who is an MD and is requesting an update. I left message to 

call me. For now, gradually adding back her meds as tolerated is appropriate, 

but it is really unclear why this is occurring with a pseudoexacerbation being 

a possible cause with the combination of acute GI problems and med effects. 





1/19/17:


Neurologic symptoms have resolved almost completely. The cause remains 

uncertain. Perhaps the GI problems plus meds have been the issue but unclear. 


I spent 30 minutes in face to face discussion with more than 50% counseling and 

review of the plan. Home once able to maintain hydration and food.





1/20/17:


Leilani says that she is neurologically feeling almost completely back to 

herself.  Her nausea was seemingly exacerbated with Zofran.  When she switched 

to Phenergan, she has really had some better control and got some good rest.  

Today she is hopeful that she will be able to eat and can go home later today.  

That is certainly good news for her and she can have Phenergan orally or 

suppositories if she wants at discharge.  She will follow-up with me at her 

regular time with return to her treatments of multiple sclerosis using tests 

every infusions.  Please contact me with any other questions.


Objective: 





 Vital Signs











Temp Pulse Resp BP Pulse Ox


 


 36.8 C   64   16   107/75   95 


 


 01/20/17 08:00  01/20/17 08:00  01/20/17 08:00  01/20/17 08:00  01/20/17 08:00








 Laboratory Results





 01/17/17 14:38 





 01/18/17 06:05 





 











 01/19/17 01/20/17 01/21/17





 05:59 05:59 05:59


 


Intake Total 1463 1080 


 


Balance 1463 1080 











Allergies/Adverse Reactions: 


 





amoxicillin trihydrate [From Augmentin] Allergy (Severe, Verified 01/17/17 12:25

)


 Diarrhea


potassium clavulanate [From Augmentin] Allergy (Severe, Verified 01/17/17 12:25)


 Diarrhea


glatiramer acetate [From Copaxone] Allergy (Verified 01/17/17 12:25)


 Hives

## 2017-01-20 NOTE — GDS
[f rep st]



                                                             DISCHARGE SUMMARY





DISCHARGE DIAGNOSES:  

1.  Acute viral gastroenteritis.

2.  Neurological complications.

3.  History of multiple sclerosis.

4.  History of migraines.



CONSULTATIONS:  Dr. Davis of Neurology.



STUDIES AND PROCEDURES:  

1.  CT of the abdomen.

2.  MRI of the brain.



PHYSICAL EXAM:  GENERAL:  The patient is alert and oriented, in no acute distress.  VITAL SIGNS:  Afe
brile at 36.8, pulse is 64, respiratory rate 16, blood pressure is 107/75, she is saturating 95% on r
oom air.  



I have seen and evaluated the patient on the day of discharge.



HOSPITAL COURSE:  The patient is a 51-year-old female who presented to the emergency room with compla
ints of nausea and vomiting.  She was evaluated and diagnosed with:

1.  Nausea/vomiting.  This is likely secondary to a viral gastroenteritis process.  The patient has r
esolved her condition she has responded well to antiemetics and she is tolerating a regular diet.  Sh
e has had no further bouts of emesis in the last 12 hours.

2.  History of MS.  During this hospitalization, a Neurology consult was obtained.  The patient had a
n MRI of her brain that did not show any further demyelinating processes and her MS appears to be sta
ble.

3.  Acute encephalopathy.  The etiology of this is unclear.  However, the patient's condition has res
olved independently with no further intervention being warranted.

4.  History of migraines.  None currently.



DISPOSITION:  The patient will be discharged home independently with her .  



I have discussed the patient's disposition with Dr. Davis who is in agreement with this plan.



PENDING STUDIES:  There are no pending studies.



FOLLOWUP:  Will be with her primary care physician, Dr. Pola Levy.



DISCHARGE INSTRUCTIONS:  It was recommended that the patient decrease her outpatient pain medications
, as they may be interfering with each other and causing acute encephalopathy intermittently.



DISCHARGE MEDICATIONS:  I have provided the patient a prescription for Phenergan 12.5 mg q.6h. and in
structed her to take half-a-tablet as needed.



TIME SPENT:  I spent greater than 35 minutes in the care, coordination, and management of the patient
's disposition.





Job #:  [id number]